# Patient Record
Sex: FEMALE | Race: WHITE | NOT HISPANIC OR LATINO | Employment: UNEMPLOYED | URBAN - NONMETROPOLITAN AREA
[De-identification: names, ages, dates, MRNs, and addresses within clinical notes are randomized per-mention and may not be internally consistent; named-entity substitution may affect disease eponyms.]

---

## 2023-03-06 ENCOUNTER — HOSPITAL ENCOUNTER (EMERGENCY)
Facility: HOSPITAL | Age: 38
Discharge: HOME | End: 2023-03-06
Attending: EMERGENCY MEDICINE | Admitting: EMERGENCY MEDICINE
Payer: COMMERCIAL

## 2023-03-06 ENCOUNTER — APPOINTMENT (OUTPATIENT)
Dept: GENERAL RADIOLOGY | Facility: HOSPITAL | Age: 38
End: 2023-03-06
Payer: COMMERCIAL

## 2023-03-06 VITALS
HEART RATE: 73 BPM | SYSTOLIC BLOOD PRESSURE: 97 MMHG | TEMPERATURE: 97.9 F | DIASTOLIC BLOOD PRESSURE: 72 MMHG | HEIGHT: 62 IN | OXYGEN SATURATION: 100 % | WEIGHT: 135 LBS | RESPIRATION RATE: 16 BRPM | BODY MASS INDEX: 24.84 KG/M2

## 2023-03-06 DIAGNOSIS — S50.01XA CONTUSION OF RIGHT ELBOW, INITIAL ENCOUNTER: Primary | ICD-10-CM

## 2023-03-06 PROCEDURE — 99283 EMERGENCY DEPT VISIT LOW MDM: CPT

## 2023-03-06 PROCEDURE — 73080 X-RAY EXAM OF ELBOW: CPT

## 2023-03-07 NOTE — ED PROVIDER NOTES
"Subjective   History of Present Illness  Presents to the ER with right elbow pain.  She states she was putting an automatic ski thrower together when it came around and hit her in the right elbow.  She has full range of motion but reports swelling to the elbow region.  Pain is 5 out of 10.  She has not taken any medications for it.  Denies desire for pain medications at this time.  Incident occurred around 5:00 this afternoon.        Review of Systems   Musculoskeletal:        Right elbow pain       History reviewed. No pertinent past medical history.    No Known Allergies    History reviewed. No pertinent surgical history.    History reviewed. No pertinent family history.    Social History     Socioeconomic History   • Marital status:    Tobacco Use   • Smoking status: Never   • Smokeless tobacco: Never           Objective    BP 97/72 (BP Location: Left arm, Patient Position: Sitting)   Pulse 73   Temp 97.9 °F (36.6 °C) (Oral)   Resp 16   Ht 157.5 cm (62\")   Wt 61.2 kg (135 lb)   SpO2 100%   BMI 24.69 kg/m²     Physical Exam  Constitutional:       Appearance: She is not ill-appearing.   Cardiovascular:      Rate and Rhythm: Normal rate.   Pulmonary:      Effort: Pulmonary effort is normal.   Musculoskeletal:         General: Tenderness and signs of injury present. No deformity.      Comments: Swelling to the right elbow.  Patient with full range of motion.  There is a small abrasion to the tip of the elbow region without active bleeding.   Skin:     General: Skin is warm and dry.      Capillary Refill: Capillary refill takes less than 2 seconds.   Neurological:      Mental Status: She is alert and oriented to person, place, and time.   Psychiatric:         Mood and Affect: Mood normal.         Behavior: Behavior normal.         Procedures  XR Elbow 3+ View Right    Result Date: 3/6/2023  Narrative: INDICATION: injury EXAMINATION/TECHNIQUE: X-RAY - right XR ELBOW 3 VIEWS COMPARISON: None. " ____________________________________________ FINDINGS:  SOFT TISSUES: There is mild dorsal soft tissue swelling. No radiopaque foreign body. BONES/JOINTS: No acute fracture is identified. Normal alignment. Joint spaces appear normal. There is no joint effusion. No sclerotic or destructive changes observed.     Impression: Soft tissue swelling. No evidence of fracture Electronically signed by:  Clyde Kent MD  3/6/2023 7:07 PM [facility] Workstation: [id number]             ED Course                                           Medical Decision Making  Patient presented to the ER with complaints of right elbow pain secondary to injury from an automatic ski thrower.  X-ray was negative for acute findings.  Home care was discussed with patient including applying ice to the area.  Patient to follow-up with orthopedist if symptoms persist or worsen.    Contusion of right elbow, initial encounter: complicated acute illness or injury  Amount and/or Complexity of Data Reviewed  Radiology: ordered.          Final diagnoses:   Contusion of right elbow, initial encounter       ED Disposition  ED Disposition     ED Disposition   Discharge    Condition   Stable    Comment   --             Sincere James MD  [address] [place] [address] Drive  [place] [address]  [phone]      ER follow up         Medication List      No changes were made to your prescriptions during this visit.          Pauline Arreaga, APRN  03/06/23 9530

## 2023-03-07 NOTE — DISCHARGE INSTRUCTIONS
Your x-ray today does not show any fractures.  See handout on RICE therapy.  May use over-the-counter Motrin or naproxen as needed for pain.  Follow-up with orthopedist if your symptoms worsen or change in presentation.